# Patient Record
Sex: FEMALE | Race: WHITE | NOT HISPANIC OR LATINO | Employment: OTHER | URBAN - METROPOLITAN AREA
[De-identification: names, ages, dates, MRNs, and addresses within clinical notes are randomized per-mention and may not be internally consistent; named-entity substitution may affect disease eponyms.]

---

## 2019-12-01 ENCOUNTER — OFFICE VISIT (OUTPATIENT)
Dept: URGENT CARE | Facility: CLINIC | Age: 84
End: 2019-12-01
Payer: MEDICARE

## 2019-12-01 VITALS
RESPIRATION RATE: 16 BRPM | WEIGHT: 147 LBS | OXYGEN SATURATION: 94 % | TEMPERATURE: 98 F | SYSTOLIC BLOOD PRESSURE: 140 MMHG | BODY MASS INDEX: 23.63 KG/M2 | HEART RATE: 88 BPM | HEIGHT: 66 IN | DIASTOLIC BLOOD PRESSURE: 90 MMHG

## 2019-12-01 DIAGNOSIS — B02.8 HERPES ZOSTER WITH COMPLICATION: ICD-10-CM

## 2019-12-01 DIAGNOSIS — S93.401A MODERATE RIGHT ANKLE SPRAIN, INITIAL ENCOUNTER: Primary | ICD-10-CM

## 2019-12-01 PROCEDURE — 73610 XR ANKLE COMPLETE 3 VIEW RIGHT: ICD-10-PCS | Mod: RT,S$GLB,, | Performed by: RADIOLOGY

## 2019-12-01 PROCEDURE — 73610 X-RAY EXAM OF ANKLE: CPT | Mod: RT,S$GLB,, | Performed by: RADIOLOGY

## 2019-12-01 PROCEDURE — 99203 OFFICE O/P NEW LOW 30 MIN: CPT | Mod: S$GLB,,, | Performed by: EMERGENCY MEDICINE

## 2019-12-01 PROCEDURE — 99203 PR OFFICE/OUTPT VISIT, NEW, LEVL III, 30-44 MIN: ICD-10-PCS | Mod: S$GLB,,, | Performed by: EMERGENCY MEDICINE

## 2019-12-01 RX ORDER — LISINOPRIL 5 MG/1
TABLET ORAL
Refills: 3 | COMMUNITY
Start: 2019-10-16

## 2019-12-01 RX ORDER — VALACYCLOVIR HYDROCHLORIDE 1 G/1
1000 TABLET, FILM COATED ORAL 3 TIMES DAILY
Qty: 30 TABLET | Refills: 0 | Status: SHIPPED | OUTPATIENT
Start: 2019-12-01 | End: 2019-12-11

## 2019-12-01 RX ORDER — TRAMADOL HYDROCHLORIDE 50 MG/1
50 TABLET ORAL EVERY 6 HOURS PRN
Qty: 20 TABLET | Refills: 0 | Status: SHIPPED | OUTPATIENT
Start: 2019-12-01 | End: 2019-12-06

## 2019-12-01 RX ORDER — METOPROLOL SUCCINATE 100 MG/1
TABLET, EXTENDED RELEASE ORAL
Refills: 3 | COMMUNITY
Start: 2019-08-26

## 2019-12-01 RX ORDER — PREDNISONE 20 MG/1
TABLET ORAL
Qty: 10 TABLET | Refills: 0 | Status: SHIPPED | OUTPATIENT
Start: 2019-12-01

## 2019-12-01 RX ORDER — RIVAROXABAN 15 MG/1
TABLET, FILM COATED ORAL
Refills: 3 | COMMUNITY
Start: 2019-10-29

## 2019-12-01 NOTE — PROGRESS NOTES
"Subjective:       Patient ID: Yudith Jack is a 89 y.o. female.    Vitals:  height is 5' 6" (1.676 m) and weight is 66.7 kg (147 lb). Her temperature is 97.9 °F (36.6 °C). Her blood pressure is 140/90 (abnormal) and her pulse is 88. Her respiration is 16 and oxygen saturation is 94% (abnormal).     Chief Complaint: Fall    Pt states she fell 7 days ago. Pt states she tripped and fell in her yard. Pt states injury to right ankle with swelling and bruising and pain. At the time of the fall she did not have any chest back or rib pain for several days and only 2 days ago did she notice some discoloration pain to the right back and chest wall.  She initially thought this was bruising however on detailed examination it clearly is a dermatomal rash with this vesicles and clearly shingles.  She denies shortness of breath or rib pain. She fell on the grass twisting her ankle..  There has been no head trauma or head injury.    Fall   The accident occurred 5 to 7 days ago. The fall occurred while walking. She landed on dirt. The point of impact was the right foot. The pain is present in the right foot (right sided chest). The pain is at a severity of 7/10. The symptoms are aggravated by use of injured limb. Associated symptoms include nausea and vomiting. Pertinent negatives include no fever or headaches. She has tried acetaminophen for the symptoms.       Constitution: Negative for chills, fatigue and fever.   HENT: Negative for congestion and sore throat.    Neck: Negative for painful lymph nodes.   Cardiovascular: Negative for chest pain and leg swelling.   Eyes: Negative for double vision and blurred vision.   Respiratory: Negative for cough and shortness of breath.    Gastrointestinal: Positive for nausea and vomiting. Negative for diarrhea.   Genitourinary: Negative for dysuria, frequency, urgency and history of kidney stones.   Musculoskeletal: Positive for joint pain and joint swelling. Negative for muscle cramps and " muscle ache.   Skin: Negative for color change, pale, rash and bruising.   Allergic/Immunologic: Negative for seasonal allergies.   Neurological: Negative for dizziness, history of vertigo, light-headedness, passing out and headaches.   Hematologic/Lymphatic: Negative for swollen lymph nodes.   Psychiatric/Behavioral: Negative for nervous/anxious, sleep disturbance and depression. The patient is not nervous/anxious.        Objective:      Physical Exam   Constitutional: She is oriented to person, place, and time. She appears well-developed and well-nourished. She is cooperative.  Non-toxic appearance. She does not appear ill. No distress.   HENT:   Head: Normocephalic and atraumatic.   Right Ear: Hearing, tympanic membrane, external ear and ear canal normal.   Left Ear: Hearing, tympanic membrane, external ear and ear canal normal.   Nose: Nose normal. No mucosal edema, rhinorrhea or nasal deformity. No epistaxis. Right sinus exhibits no maxillary sinus tenderness and no frontal sinus tenderness. Left sinus exhibits no maxillary sinus tenderness and no frontal sinus tenderness.   Mouth/Throat: Uvula is midline, oropharynx is clear and moist and mucous membranes are normal. No trismus in the jaw. Normal dentition. No uvula swelling. No posterior oropharyngeal erythema.   Eyes: Conjunctivae and lids are normal. Right eye exhibits no discharge. Left eye exhibits no discharge. No scleral icterus.   Neck: Trachea normal, normal range of motion, full passive range of motion without pain and phonation normal. Neck supple.   Cardiovascular: Normal rate, regular rhythm, normal heart sounds, intact distal pulses and normal pulses.   Pulmonary/Chest: Effort normal and breath sounds normal. No respiratory distress. She exhibits tenderness.   Patient has a right mid to lower back and lateral chest wall also wrapping anteriorly vesicular rash classically typical for shingles.  There is no bruising.  There is no rib or chest wall  tenderness other than over the rash   Abdominal: Soft. Normal appearance and bowel sounds are normal. She exhibits no distension, no pulsatile midline mass and no mass. There is no tenderness.   Musculoskeletal: She exhibits edema and tenderness. She exhibits no deformity.   Significant lateral ankle tenderness and swelling and ecchymosis.  It is a week old.   Neurological: She is alert and oriented to person, place, and time. No sensory deficit. She exhibits normal muscle tone. Coordination normal.   Skin: Skin is warm, dry, intact, not diaphoretic and not pale.   Psychiatric: She has a normal mood and affect. Her speech is normal and behavior is normal. Judgment and thought content normal. Cognition and memory are normal.   Nursing note and vitals reviewed.        Xray negative for acute fracture  Will ace wrap and continue rice therapy      EXAMINATION:  XR ANKLE COMPLETE 3 VIEW RIGHT    CLINICAL HISTORY:  Sprain of unspecified ligament of right ankle, initial encounter    TECHNIQUE:  AP, lateral, and oblique images of the right ankle were performed.    COMPARISON:  None    FINDINGS:  The tibiotalar joint is well aligned.  There is no fracture seen, no osseous lesions.  There is  subcutaneous soft tissue edema about the lower leg.   Impression       No definite acute process seen      Electronically signed by: Korin Chan MD  Date: 12/01/2019  Time: 14:13    Encounter                 Assessment:       1. Moderate right ankle sprain, initial encounter    2. Herpes zoster with complication        Plan:         Moderate right ankle sprain, initial encounter  -     X-Ray Ankle Complete 3 View Right; Future; Expected date: 12/01/2019    Herpes zoster with complication    Other orders  -     valACYclovir (VALTREX) 1000 MG tablet; Take 1 tablet (1,000 mg total) by mouth 3 (three) times daily. for 10 days  Dispense: 30 tablet; Refill: 0  -     predniSONE (DELTASONE) 20 MG tablet; Take 2 tablets daily for 5 days   Dispense: 10 tablet; Refill: 0  -     traMADol (ULTRAM) 50 mg tablet; Take 1 tablet (50 mg total) by mouth every 6 (six) hours as needed for Pain.  Dispense: 20 tablet; Refill: 0         Patient Instructions     Review shingle sheet  Review ankle sprain sheet  X-ray negative for fracture of the ankle  Valtrex prescription  Prednisone prescription  Tramadol only if necessary for pain  Rest and ice and elevate and Ace wrap the right ankle.  Follow-up with orthopedist if having persistent right ankle pain and 1-2 weeks  Follow-up with PCP.      ACE Wrap  Minor muscle or joint injuries are often treated with an elastic bandage. The bandage provides support and compression to the injured area. An elastic bandage is a stretchy, rolled bandage. Elastic bandages range in width from 2 to 6 inches. They can be used for a variety of injuries. The bandages are often called ACE bandages, after the most common brand name.  If used correctly, elastic bandages help control swelling and ease pain. An elastic bandage is also a good reminder not to overuse the injured area. However, elastic bandages do not provide a lot of support and will not prevent reinjury.  Home care    To apply an elastic bandage:  · Check the skin before wrapping the injury. It should be clean, dry, and free of drainage.  · Start wrapping below the injury and work your way toward the body. For an ankle sprain, start wrapping around the foot and work up toward the calf. This will help control swelling.  · Overlap the edges of the bandage so it stays snuggly in place.  · Wrap the bandage firmly, but not too tightly. A tight bandage can increase swelling on either end of the bandage. Make sure the bandage is wrinkle free.  · Leave fingers and toes exposed.  · Secure ends of the bandage (even self-sticking ones) with clips or tape.  · Check frequently to ensure adequate circulation, especially in the fingers and toes. Loosen the bandage if there is local swelling,  numbness, tingling, discomfort, coldness, or discoloration (skin pale or bluish in color).  · Rewrap the bandage as needed during the day. Reroll the bandage as you unwind it.  Continue using the elastic bandage until the pain and swelling are gone or as your healthcare provider advises.  If you have been told to ice the area, the ice can be secured in place with the elastic bandage. Wrap the ice pack with a thin towel to protect the skin. Do not put ice or an ice pack directly on the skin.  Ice the area for no more than 20 minutes at a time.    Follow-up care  Follow up with your healthcare provider, as advised.  When to seek medical advice  Call your healthcare provider for any of the following:  · Pain and swelling that doesn't get better or gets worse  · Trouble moving injured area  · Skin discoloration, numbness, or tingling that doesnt go away after bandage is removed  Date Last Reviewed: 9/13/2015 © 2000-2017 [employer]. [address] [place] [address]. All rights reserved. This information is not intended as a substitute for professional medical care. Always follow your healthcare professional's instructions.        Ankle Sprain, with X-Ray   A sprain is an injury to the ligaments that hold the ankle joint together. There are no broken bones. Most sprains take about 4 to 6 weeks to heal. A severe sprain, where the ligament is completely torn, can take several months to recover.  Mild to moderate sprains may be treated with an elastic wrap or an in-shoe splint. This will provide support and prevent re-injury. A mild sprain may not need any additional support. A severe sprain may require surgery to repair. In some cases a cast or boot may be needed.  Home care  · Stay off your injured ankle as much as possible until you can walk on it without pain. If you have a lot of pain with walking, then crutches or a walker may be prescribed. These can be rented or purchased at many pharmacies  and surgical or orthopedic supply stores. Follow your healthcare providers advice about when to begin bearing weight on that leg.  · Keep your leg elevated to reduce pain and swelling. When sleeping, place a pillow under your injured ankle. When sitting, support your injured ankle and leg so they are level with your waist. This is very important during the first 48 hours.  · Place an ice pack over the injured area for no more than 20 minutes. Do this every 3 to 6 hours for the first 24 to 48 hours, or as instructed. To make an ice pack, put ice cubes in a plastic bag that seals at the top. Wrap the bag in a clean, thin towel or cloth. You can place the ice pack directly over the wrap, splint, or cast. Never place an ice pack directly on your skin. As the ice melts, be careful not to get any wrap, splint, or cast wet. Keep using ice packs as needed to ease pain and swelling.    · If you have a boot, open it to apply an ice pack, unless told otherwise by your provider. Wrap the ice pack in a clean, thin towel or cloth. Never put an ice pack directly on your skin.  · After 48 hours, it may also be helpful to apply heat for no more than 20 minutes, several times a day. You can do this with a heating pad or warm compress. Or you may want to go back and forth between using ice and heat. Never apply heat directly to the skin. Always wrap the heating pad or warm compress in a clean, thin towel or cloth.  · You may use over-the-counter pain medicine to ease pain, unless another pain medicine was prescribed. Talk with your provider before using these medicines if you have chronic liver or kidney disease, or have ever had a stomach ulcer or GI (gastrointestinal) bleeding.  · You may return to sports after your ankle heals, when you can run without pain.  · You are at risk of getting injured again for the first 6 weeks. During that time, protect your ankle with an in-shoe splint that prevents your ankle from tilting side to  side. This is very important if you do active work or play sports during that time.  Follow-up care  Any X-rays you had today dont show any broken bones, breaks, or fractures. Bruises and sprains can sometimes hurt as much as a fracture. These injuries can take time to heal completely. If your symptoms dont improve or they get worse, talk with your healthcare provider. You may need a repeat X-ray.  When to seek medical advice  Call your healthcare provider right away if any of these occur:  · The plaster cast or splint gets wet or soft  · The fiberglass cast or splint gets wet and does not dry for 24 hours  · The pain or swelling increases, or redness appears  · The cast has a bad smell  · Fever of 100.4 F (38 C) or higher, or as directed  · Your toes become cold, blue, numb, or tingly  · You re-injure your ankle  Date Last Reviewed: 11/20/2015  © 6576-2272 [employer]. [address] [place] [address], [place] [address]. All rights reserved. This information is not intended as a substitute for professional medical care. Always follow your healthcare professional's instructions.        Shingles (Herpes Zoster)     Talk to your healthcare provider about the shingles vaccine.     Shingles is also called herpes zoster. It is a painful skin rash caused by the herpes zoster virus. This is the same virus that causes chickenpox. After a person has chickenpox, the virus remains inactive in the nerve cells. Years later, the virus can become active again and travel to the skin. Most people have shingles only once, but it is possible to have it more than once.  What are the risk factors for shingles?  Anyone who has ever had chickenpox can develop shingles. But your risk is greater if you:  · Are 50 years of age or older  · Have an illness that weakens your immune system, such as HIV/AIDS  · Have cancer, especially Hodgkin disease or lymphoma  · Take medicines that weaken your immune system  What are the symptoms of  shingles?  · The first sign of shingles is usually pain, burning, tingling, or itching on one part of your face or body. You may also feel as if you have the flu, with fever and chills.  · A red rash with small blisters appears within a few days. The rash may appear as follows:   ¨ The blisters can occur anywhere, but theyre most common on the back, chest, or abdomen.  ¨ They usually appear on only one side of the body, spreading along the nerve pathway where the virus was inactive.   ¨ The rash can also form around an eye, along one side of the face or neck, or in the mouth.  ¨ In a few people, usually those with weakened immune systems, shingles appear on more than one part of the body at once.  · After a few days, the blisters become dry and form a crust. The crust falls off in days to weeks. The blisters generally do not leave scars.  How is shingles treated?  For most people, shingles heals on its own in a few weeks. But treatment is recommended to help relieve pain, speed healing, and reduce the risk of complications. Antiviral medicines are prescribed within the first 72 hours of the appearance of the rash. To lessen symptoms:  · Apply ice packs (wrapped in a thin towel) or cool compresses, or soak in a cool bath.  · Use calamine lotion to calm itchy skin.  · Ask your healthcare provider about over-the-counter pain relievers. If your pain is severe, your healthcare provider may prescribe stronger pain medicines.  What are the complications of shingles?  Shingles often goes away with no lasting effects. But some people have serious problems long after the blisters have healed:  · Postherpetic neuralgia. This is the most common complication. It is severe nerve pain at the place where the rash used to be. It can last for months, or even years after you have had shingles. Medicines can be prescribed to help relieve the pain and improve quality of life.  · Bacterial infection. Shingles blisters may become  infected with bacteria. Antibiotic medicine is used to treat the infection.  · Eye problems. A person with shingles on the face should see his or her healthcare provider right away. Shingles can cause serious problems with vision, and even blindness.  Very rarely shingles can also lead to pneumonia, hearing problems, brain inflammation, or even death.   When to seek medical care  Contact your healthcare provider if you experience any of the following:  · Symptoms that dont go away with treatment  · A rash or blisters near your eye  · Increased drainage, fever, or rash after treatment, or severe pain that doesnt go away   How can shingles be prevented?  You can only get shingles if you have had chicken pox in the past. Those who have never had chickenpox can get the virus from you. Although instead of developing shingles, the person may get chickenpox. Until your blisters form scabs, avoid contact with others, especially the following:  · Pregnant women who have never had chickenpox or the vaccine  · Infants who were born early (prematurely) or who had low weight at birth  · People with weak immune system (for example, people receiving chemotherapy for cancer, people who have had organ transplants, or people with HIV infections)     The shingles vaccine  If youre 60 years of age or older, ask your healthcare provider if you should receive the shingles vaccine. The vaccine makes it less likely that you will develop shingles. If you do develop shingles, your symptoms will likely be milder than if you hadnt been vaccinated. Note: Although the vaccine is licensed for people 50 years of age or older, the CDC does not recommend the vaccine for those who are 50 to 59 years old.   Date Last Reviewed: 10/1/2016  © 3724-6582 [employer]. [address] [place] [address]. All rights reserved. This information is not intended as a substitute for professional medical care. Always follow your healthcare  professional's instructions.

## 2019-12-01 NOTE — PATIENT INSTRUCTIONS
Review shingle sheet  Review ankle sprain sheet  X-ray negative for fracture of the ankle  Valtrex prescription  Prednisone prescription  Tramadol only if necessary for pain  Rest and ice and elevate and Ace wrap the right ankle.  Follow-up with orthopedist if having persistent right ankle pain and 1-2 weeks  Follow-up with PCP.      ACE Wrap  Minor muscle or joint injuries are often treated with an elastic bandage. The bandage provides support and compression to the injured area. An elastic bandage is a stretchy, rolled bandage. Elastic bandages range in width from 2 to 6 inches. They can be used for a variety of injuries. The bandages are often called ACE bandages, after the most common brand name.  If used correctly, elastic bandages help control swelling and ease pain. An elastic bandage is also a good reminder not to overuse the injured area. However, elastic bandages do not provide a lot of support and will not prevent reinjury.  Home care    To apply an elastic bandage:  · Check the skin before wrapping the injury. It should be clean, dry, and free of drainage.  · Start wrapping below the injury and work your way toward the body. For an ankle sprain, start wrapping around the foot and work up toward the calf. This will help control swelling.  · Overlap the edges of the bandage so it stays snuggly in place.  · Wrap the bandage firmly, but not too tightly. A tight bandage can increase swelling on either end of the bandage. Make sure the bandage is wrinkle free.  · Leave fingers and toes exposed.  · Secure ends of the bandage (even self-sticking ones) with clips or tape.  · Check frequently to ensure adequate circulation, especially in the fingers and toes. Loosen the bandage if there is local swelling, numbness, tingling, discomfort, coldness, or discoloration (skin pale or bluish in color).  · Rewrap the bandage as needed during the day. Reroll the bandage as you unwind it.  Continue using the elastic bandage  until the pain and swelling are gone or as your healthcare provider advises.  If you have been told to ice the area, the ice can be secured in place with the elastic bandage. Wrap the ice pack with a thin towel to protect the skin. Do not put ice or an ice pack directly on the skin.  Ice the area for no more than 20 minutes at a time.    Follow-up care  Follow up with your healthcare provider, as advised.  When to seek medical advice  Call your healthcare provider for any of the following:  · Pain and swelling that doesn't get better or gets worse  · Trouble moving injured area  · Skin discoloration, numbness, or tingling that doesnt go away after bandage is removed  Date Last Reviewed: 9/13/2015 © 2000-2017 [employer]. [address] [place] [address], [place] [address]. All rights reserved. This information is not intended as a substitute for professional medical care. Always follow your healthcare professional's instructions.        Ankle Sprain, with X-Ray   A sprain is an injury to the ligaments that hold the ankle joint together. There are no broken bones. Most sprains take about 4 to 6 weeks to heal. A severe sprain, where the ligament is completely torn, can take several months to recover.  Mild to moderate sprains may be treated with an elastic wrap or an in-shoe splint. This will provide support and prevent re-injury. A mild sprain may not need any additional support. A severe sprain may require surgery to repair. In some cases a cast or boot may be needed.  Home care  · Stay off your injured ankle as much as possible until you can walk on it without pain. If you have a lot of pain with walking, then crutches or a walker may be prescribed. These can be rented or purchased at many pharmacies and surgical or orthopedic supply stores. Follow your healthcare providers advice about when to begin bearing weight on that leg.  · Keep your leg elevated to reduce pain and swelling. When sleeping, place a  pillow under your injured ankle. When sitting, support your injured ankle and leg so they are level with your waist. This is very important during the first 48 hours.  · Place an ice pack over the injured area for no more than 20 minutes. Do this every 3 to 6 hours for the first 24 to 48 hours, or as instructed. To make an ice pack, put ice cubes in a plastic bag that seals at the top. Wrap the bag in a clean, thin towel or cloth. You can place the ice pack directly over the wrap, splint, or cast. Never place an ice pack directly on your skin. As the ice melts, be careful not to get any wrap, splint, or cast wet. Keep using ice packs as needed to ease pain and swelling.    · If you have a boot, open it to apply an ice pack, unless told otherwise by your provider. Wrap the ice pack in a clean, thin towel or cloth. Never put an ice pack directly on your skin.  · After 48 hours, it may also be helpful to apply heat for no more than 20 minutes, several times a day. You can do this with a heating pad or warm compress. Or you may want to go back and forth between using ice and heat. Never apply heat directly to the skin. Always wrap the heating pad or warm compress in a clean, thin towel or cloth.  · You may use over-the-counter pain medicine to ease pain, unless another pain medicine was prescribed. Talk with your provider before using these medicines if you have chronic liver or kidney disease, or have ever had a stomach ulcer or GI (gastrointestinal) bleeding.  · You may return to sports after your ankle heals, when you can run without pain.  · You are at risk of getting injured again for the first 6 weeks. During that time, protect your ankle with an in-shoe splint that prevents your ankle from tilting side to side. This is very important if you do active work or play sports during that time.  Follow-up care  Any X-rays you had today dont show any broken bones, breaks, or fractures. Bruises and sprains can sometimes  hurt as much as a fracture. These injuries can take time to heal completely. If your symptoms dont improve or they get worse, talk with your healthcare provider. You may need a repeat X-ray.  When to seek medical advice  Call your healthcare provider right away if any of these occur:  · The plaster cast or splint gets wet or soft  · The fiberglass cast or splint gets wet and does not dry for 24 hours  · The pain or swelling increases, or redness appears  · The cast has a bad smell  · Fever of 100.4 F (38 C) or higher, or as directed  · Your toes become cold, blue, numb, or tingly  · You re-injure your ankle  Date Last Reviewed: 11/20/2015  © 7844-4147 [employer]. [address] [place] [address], [place] [address]. All rights reserved. This information is not intended as a substitute for professional medical care. Always follow your healthcare professional's instructions.        Shingles (Herpes Zoster)     Talk to your healthcare provider about the shingles vaccine.     Shingles is also called herpes zoster. It is a painful skin rash caused by the herpes zoster virus. This is the same virus that causes chickenpox. After a person has chickenpox, the virus remains inactive in the nerve cells. Years later, the virus can become active again and travel to the skin. Most people have shingles only once, but it is possible to have it more than once.  What are the risk factors for shingles?  Anyone who has ever had chickenpox can develop shingles. But your risk is greater if you:  · Are 50 years of age or older  · Have an illness that weakens your immune system, such as HIV/AIDS  · Have cancer, especially Hodgkin disease or lymphoma  · Take medicines that weaken your immune system  What are the symptoms of shingles?  · The first sign of shingles is usually pain, burning, tingling, or itching on one part of your face or body. You may also feel as if you have the flu, with fever and chills.  · A red rash with small  blisters appears within a few days. The rash may appear as follows:   ¨ The blisters can occur anywhere, but theyre most common on the back, chest, or abdomen.  ¨ They usually appear on only one side of the body, spreading along the nerve pathway where the virus was inactive.   ¨ The rash can also form around an eye, along one side of the face or neck, or in the mouth.  ¨ In a few people, usually those with weakened immune systems, shingles appear on more than one part of the body at once.  · After a few days, the blisters become dry and form a crust. The crust falls off in days to weeks. The blisters generally do not leave scars.  How is shingles treated?  For most people, shingles heals on its own in a few weeks. But treatment is recommended to help relieve pain, speed healing, and reduce the risk of complications. Antiviral medicines are prescribed within the first 72 hours of the appearance of the rash. To lessen symptoms:  · Apply ice packs (wrapped in a thin towel) or cool compresses, or soak in a cool bath.  · Use calamine lotion to calm itchy skin.  · Ask your healthcare provider about over-the-counter pain relievers. If your pain is severe, your healthcare provider may prescribe stronger pain medicines.  What are the complications of shingles?  Shingles often goes away with no lasting effects. But some people have serious problems long after the blisters have healed:  · Postherpetic neuralgia. This is the most common complication. It is severe nerve pain at the place where the rash used to be. It can last for months, or even years after you have had shingles. Medicines can be prescribed to help relieve the pain and improve quality of life.  · Bacterial infection. Shingles blisters may become infected with bacteria. Antibiotic medicine is used to treat the infection.  · Eye problems. A person with shingles on the face should see his or her healthcare provider right away. Shingles can cause serious problems  with vision, and even blindness.  Very rarely shingles can also lead to pneumonia, hearing problems, brain inflammation, or even death.   When to seek medical care  Contact your healthcare provider if you experience any of the following:  · Symptoms that dont go away with treatment  · A rash or blisters near your eye  · Increased drainage, fever, or rash after treatment, or severe pain that doesnt go away   How can shingles be prevented?  You can only get shingles if you have had chicken pox in the past. Those who have never had chickenpox can get the virus from you. Although instead of developing shingles, the person may get chickenpox. Until your blisters form scabs, avoid contact with others, especially the following:  · Pregnant women who have never had chickenpox or the vaccine  · Infants who were born early (prematurely) or who had low weight at birth  · People with weak immune system (for example, people receiving chemotherapy for cancer, people who have had organ transplants, or people with HIV infections)     The shingles vaccine  If youre 60 years of age or older, ask your healthcare provider if you should receive the shingles vaccine. The vaccine makes it less likely that you will develop shingles. If you do develop shingles, your symptoms will likely be milder than if you hadnt been vaccinated. Note: Although the vaccine is licensed for people 50 years of age or older, the CDC does not recommend the vaccine for those who are 50 to 59 years old.   Date Last Reviewed: 10/1/2016  © 1741-9852 [employer]. [address] [place] [address], [place] [address]. All rights reserved. This information is not intended as a substitute for professional medical care. Always follow your healthcare professional's instructions.